# Patient Record
Sex: MALE | Race: WHITE | NOT HISPANIC OR LATINO | Employment: OTHER | URBAN - METROPOLITAN AREA
[De-identification: names, ages, dates, MRNs, and addresses within clinical notes are randomized per-mention and may not be internally consistent; named-entity substitution may affect disease eponyms.]

---

## 2017-08-31 ENCOUNTER — ESTABLISHED COMPREHENSIVE EXAM (OUTPATIENT)
Dept: URBAN - METROPOLITAN AREA CLINIC 35 | Facility: CLINIC | Age: 57
End: 2017-08-31

## 2017-08-31 DIAGNOSIS — H04.123: ICD-10-CM

## 2017-08-31 DIAGNOSIS — H02.836: ICD-10-CM

## 2017-08-31 DIAGNOSIS — H02.833: ICD-10-CM

## 2017-08-31 DIAGNOSIS — H01.003: ICD-10-CM

## 2017-08-31 DIAGNOSIS — H25.9: ICD-10-CM

## 2017-08-31 DIAGNOSIS — H01.006: ICD-10-CM

## 2017-08-31 PROCEDURE — 92014 COMPRE OPH EXAM EST PT 1/>: CPT

## 2017-08-31 PROCEDURE — 92015 DETERMINE REFRACTIVE STATE: CPT

## 2017-08-31 ASSESSMENT — VISUAL ACUITY
OD_SC: 20/25
OS_CC: J3
OD_CC: J1
OS_SC: 20/40

## 2017-08-31 ASSESSMENT — TONOMETRY
OD_IOP_MMHG: 13
OS_IOP_MMHG: 14

## 2018-04-13 ENCOUNTER — ESTABLISHED COMPREHENSIVE EXAM (OUTPATIENT)
Dept: URBAN - METROPOLITAN AREA CLINIC 35 | Facility: CLINIC | Age: 58
End: 2018-04-13

## 2018-04-13 DIAGNOSIS — H25.9: ICD-10-CM

## 2018-04-13 DIAGNOSIS — H04.123: ICD-10-CM

## 2018-04-13 DIAGNOSIS — H01.006: ICD-10-CM

## 2018-04-13 DIAGNOSIS — H01.003: ICD-10-CM

## 2018-04-13 PROCEDURE — 92015 DETERMINE REFRACTIVE STATE: CPT

## 2018-04-13 PROCEDURE — 92014 COMPRE OPH EXAM EST PT 1/>: CPT

## 2018-04-13 ASSESSMENT — KERATOMETRY
OD_K2POWER_DIOPTERS: 44.00
OS_K2POWER_DIOPTERS: 44.50
OS_K1POWER_DIOPTERS: 44.25
OD_K1POWER_DIOPTERS: 44.00

## 2018-04-13 ASSESSMENT — VISUAL ACUITY
OS_CC: 20/25-2
OD_CC: J1+
OS_CC: J1+
OD_CC: 20/20-2

## 2018-04-13 ASSESSMENT — TONOMETRY
OS_IOP_MMHG: 13
OD_IOP_MMHG: 13

## 2019-05-06 ENCOUNTER — ESTABLISHED COMPREHENSIVE EXAM (OUTPATIENT)
Dept: URBAN - METROPOLITAN AREA CLINIC 35 | Facility: CLINIC | Age: 59
End: 2019-05-06

## 2019-05-06 DIAGNOSIS — H25.9: ICD-10-CM

## 2019-05-06 DIAGNOSIS — H04.123: ICD-10-CM

## 2019-05-06 DIAGNOSIS — H02.836: ICD-10-CM

## 2019-05-06 DIAGNOSIS — H02.833: ICD-10-CM

## 2019-05-06 DIAGNOSIS — H01.006: ICD-10-CM

## 2019-05-06 DIAGNOSIS — H01.003: ICD-10-CM

## 2019-05-06 DIAGNOSIS — H52.7: ICD-10-CM

## 2019-05-06 PROCEDURE — 92014 COMPRE OPH EXAM EST PT 1/>: CPT

## 2019-05-06 PROCEDURE — 92015 DETERMINE REFRACTIVE STATE: CPT

## 2019-05-06 ASSESSMENT — VISUAL ACUITY
OS_SC: 20/25-1
OS_CC: J3
OD_CC: J2-

## 2019-05-06 ASSESSMENT — TONOMETRY
OD_IOP_MMHG: 12
OS_IOP_MMHG: 12

## 2019-05-06 ASSESSMENT — KERATOMETRY
OS_K1POWER_DIOPTERS: 44.25
OS_K2POWER_DIOPTERS: 44.50
OD_K2POWER_DIOPTERS: 44.00
OD_K1POWER_DIOPTERS: 44.00

## 2020-02-03 ENCOUNTER — ESTABLISHED COMPREHENSIVE EXAM (OUTPATIENT)
Dept: URBAN - METROPOLITAN AREA CLINIC 35 | Facility: CLINIC | Age: 60
End: 2020-02-03

## 2020-02-03 DIAGNOSIS — H52.7: ICD-10-CM

## 2020-02-03 DIAGNOSIS — H25.9: ICD-10-CM

## 2020-02-03 PROCEDURE — 92014 COMPRE OPH EXAM EST PT 1/>: CPT

## 2020-02-03 PROCEDURE — 92015 DETERMINE REFRACTIVE STATE: CPT

## 2020-02-03 ASSESSMENT — KERATOMETRY
OD_K2POWER_DIOPTERS: 44.00
OS_K1POWER_DIOPTERS: 44.25
OD_K1POWER_DIOPTERS: 44.00
OS_K2POWER_DIOPTERS: 44.50

## 2020-02-03 ASSESSMENT — VISUAL ACUITY
OS_CC: J6
OS_BAT: 20/40W/MR
OD_CC: J3-
OD_SC: 20/20-1

## 2020-02-03 ASSESSMENT — TONOMETRY
OS_IOP_MMHG: 12
OD_IOP_MMHG: 12

## 2020-03-10 NOTE — PATIENT DISCUSSION
New Prescription: PreserVision AREDS 2 (vit c,z-ls-uctga-lutein-zeaxan): capsule: [id number] mg-unit-mg-mg 1 capsule twice a day as directed by mouth 03-

## 2020-03-10 NOTE — PATIENT DISCUSSION
- Continue regular lubrication with artificial tears, preferably preservative free.  Consider SuperK in future if vision becomes worse

## 2020-10-31 NOTE — PATIENT DISCUSSION
- Discussed the warning signs of retinal detachment Hx of RLE DVT, on Xarelto 2019   -Hold in setting of GIB  -SCDs for PPx  -get RLE US to see if DVT has resolved

## 2021-01-19 NOTE — PATIENT DISCUSSION
New Prescription: Valtrex (valacyclovir): tablet: 1 g 1 gram three times a day as directed by mouth 01-

## 2021-01-19 NOTE — PATIENT DISCUSSION
New Prescription: Zirgan (ganciclovir): gel: 0.15% 1 a small amount five times a day as directed into right eye 01-

## 2021-01-22 NOTE — PATIENT DISCUSSION
Continue: Blanquita Barraza (ganciclovir): gel: 0.15% 1 a small amount five times a day as directed into right eye 01-

## 2021-01-22 NOTE — PATIENT DISCUSSION
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  severe appearance, NOW SIGNIFICANTLY IMPROVED

## 2021-02-11 NOTE — PATIENT DISCUSSION
New Prescription: trifluridine (trifluridine): drops: 1% 1 drop five times a day as directed into right eye 02-

## 2021-02-11 NOTE — PATIENT DISCUSSION
- Continue Valtrex 1 gram TID x 28 days total. Will then start either Valtrex 1 gram daily after that or return to daily Acyclovir

## 2021-02-11 NOTE — PATIENT DISCUSSION
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 severe appearance, NOW SIGNIFICANTLY IMPROVED, THOUGH WITH POSSIBLE EARLY RECURRENT DENDRITE TODAY.  Eye has been feeling more painful and photophobic too

## 2021-02-22 NOTE — PATIENT DISCUSSION
Continue: trifluridine (trifluridine): drops: 1% 1 drop five times a day as directed into right eye 02-

## 2021-02-22 NOTE — PATIENT DISCUSSION
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 severe appearance, now significantly improved. No dendrites today. Severe dry eye OD.  Suspect that current discomfort is due to toxicity of Trifluridine and dry eye

## 2021-03-03 NOTE — PATIENT DISCUSSION
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 Dendrites have now resolved, with residual severe dry eye OD.  Suspect that current discomfort is due to toxicity of Trifluridine and dry eye

## 2021-03-03 NOTE — PATIENT DISCUSSION
New Prescription: Pred Forte (prednisolone acetate): drops,suspension: 1% 1 drop twice a day as directed into right eye 03-

## 2021-04-23 NOTE — PATIENT DISCUSSION
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  Very irregular epithelium OU with dense ridge slightly below visual axis OS

## 2021-08-02 NOTE — PATIENT DISCUSSION
- Don't use any more Trifluridine PRN.  Use artificial tears instead for comfort and call for an exam if he suspects recurrence

## 2021-08-02 NOTE — PATIENT DISCUSSION
- Initially extensive dendrites covering the majority of the cornea.  Dendrites have now resolved, with residual stromal scarring and dry eye

## 2021-10-19 ENCOUNTER — ESTABLISHED COMPREHENSIVE EXAM (OUTPATIENT)
Dept: URBAN - METROPOLITAN AREA CLINIC 35 | Facility: CLINIC | Age: 61
End: 2021-10-19

## 2021-10-19 DIAGNOSIS — H01.006: ICD-10-CM

## 2021-10-19 DIAGNOSIS — H04.123: ICD-10-CM

## 2021-10-19 DIAGNOSIS — H02.833: ICD-10-CM

## 2021-10-19 DIAGNOSIS — H02.836: ICD-10-CM

## 2021-10-19 DIAGNOSIS — H25.9: ICD-10-CM

## 2021-10-19 DIAGNOSIS — H01.003: ICD-10-CM

## 2021-10-19 DIAGNOSIS — H52.7: ICD-10-CM

## 2021-10-19 PROCEDURE — 92014 COMPRE OPH EXAM EST PT 1/>: CPT

## 2021-10-19 PROCEDURE — 92015 DETERMINE REFRACTIVE STATE: CPT

## 2021-10-19 ASSESSMENT — VISUAL ACUITY
OS_SC: 20/30-2
OD_CC: J4
OD_SC: 20/25-2
OS_CC: J4-

## 2021-10-19 ASSESSMENT — TONOMETRY
OS_IOP_MMHG: 12
OD_IOP_MMHG: 12

## 2021-10-19 ASSESSMENT — KERATOMETRY
OD_K1POWER_DIOPTERS: 44.00
OS_K2POWER_DIOPTERS: 44.50
OS_K1POWER_DIOPTERS: 44.25
OD_K2POWER_DIOPTERS: 44.00

## 2021-12-06 NOTE — PATIENT DISCUSSION
Initially extensive dendrites covering the majority of the cornea. Dendrites have now resolved, with residual severe stromal scarring and dry eye. Causing severe glare at night and blurry vision during the day.

## 2021-12-20 NOTE — PATIENT DISCUSSION
Initially extensive dendrites covering the majority of the cornea. Dendrites have now resolved, with residual severe stromal scarring and dry eye. Causing severe glare at night and blurry vision during the day. Improved since starting steroid drops.

## 2022-10-20 ENCOUNTER — COMPREHENSIVE EXAM (OUTPATIENT)
Dept: URBAN - METROPOLITAN AREA CLINIC 35 | Facility: CLINIC | Age: 62
End: 2022-10-20

## 2022-10-20 DIAGNOSIS — H04.123: ICD-10-CM

## 2022-10-20 DIAGNOSIS — H25.9: ICD-10-CM

## 2022-10-20 DIAGNOSIS — H01.006: ICD-10-CM

## 2022-10-20 DIAGNOSIS — H02.836: ICD-10-CM

## 2022-10-20 DIAGNOSIS — H02.833: ICD-10-CM

## 2022-10-20 DIAGNOSIS — H01.003: ICD-10-CM

## 2022-10-20 DIAGNOSIS — H52.7: ICD-10-CM

## 2022-10-20 PROCEDURE — 92015 DETERMINE REFRACTIVE STATE: CPT

## 2022-10-20 PROCEDURE — 92014 COMPRE OPH EXAM EST PT 1/>: CPT

## 2022-10-20 ASSESSMENT — VISUAL ACUITY
OD_SC: 20/25-1
OD_CC: J2-
OS_SC: 20/25
OS_CC: J3
OU_SC: 20/25+1
OU_CC: J1

## 2022-10-20 ASSESSMENT — KERATOMETRY
OD_K1POWER_DIOPTERS: 44.00
OS_K1POWER_DIOPTERS: 44.25
OS_K2POWER_DIOPTERS: 44.50
OD_K2POWER_DIOPTERS: 44.00

## 2022-10-20 ASSESSMENT — TONOMETRY
OS_IOP_MMHG: 9
OD_IOP_MMHG: 10

## 2022-10-25 NOTE — PATIENT DISCUSSION
[facility] monitoring, AREDS2 vitamins, no smoking, green leafy vegetables discussed.

## 2023-10-24 ENCOUNTER — COMPREHENSIVE EXAM (OUTPATIENT)
Dept: URBAN - METROPOLITAN AREA CLINIC 35 | Facility: CLINIC | Age: 63
End: 2023-10-24

## 2023-10-24 DIAGNOSIS — H02.836: ICD-10-CM

## 2023-10-24 DIAGNOSIS — H52.7: ICD-10-CM

## 2023-10-24 DIAGNOSIS — H25.9: ICD-10-CM

## 2023-10-24 DIAGNOSIS — H01.006: ICD-10-CM

## 2023-10-24 DIAGNOSIS — H01.003: ICD-10-CM

## 2023-10-24 DIAGNOSIS — H04.123: ICD-10-CM

## 2023-10-24 DIAGNOSIS — H02.833: ICD-10-CM

## 2023-10-24 PROCEDURE — 92015 DETERMINE REFRACTIVE STATE: CPT

## 2023-10-24 PROCEDURE — 92014 COMPRE OPH EXAM EST PT 1/>: CPT

## 2023-10-24 ASSESSMENT — VISUAL ACUITY
OD_CC: J2
OD_PH: 20/20-1
OS_CC: J4
OU_CC: J1
OD_SC: 20/40-1
OU_SC: 20/25
OS_SC: 20/30-2

## 2023-10-24 ASSESSMENT — TONOMETRY
OD_IOP_MMHG: 13
OS_IOP_MMHG: 12

## 2024-10-29 ENCOUNTER — PREPPED CHART (OUTPATIENT)
Dept: URBAN - METROPOLITAN AREA CLINIC 35 | Facility: CLINIC | Age: 64
End: 2024-10-29

## 2025-02-05 ENCOUNTER — COMPREHENSIVE EXAM (OUTPATIENT)
Age: 65
End: 2025-02-05

## 2025-02-05 DIAGNOSIS — H25.9: ICD-10-CM

## 2025-02-05 DIAGNOSIS — H02.833: ICD-10-CM

## 2025-02-05 DIAGNOSIS — H02.836: ICD-10-CM

## 2025-02-05 DIAGNOSIS — H01.003: ICD-10-CM

## 2025-02-05 DIAGNOSIS — H01.006: ICD-10-CM

## 2025-02-05 DIAGNOSIS — H04.123: ICD-10-CM

## 2025-02-05 DIAGNOSIS — H52.7: ICD-10-CM

## 2025-02-05 PROCEDURE — 92015 DETERMINE REFRACTIVE STATE: CPT

## 2025-02-05 PROCEDURE — 92014 COMPRE OPH EXAM EST PT 1/>: CPT
